# Patient Record
Sex: FEMALE | Race: WHITE | Employment: OTHER | URBAN - METROPOLITAN AREA
[De-identification: names, ages, dates, MRNs, and addresses within clinical notes are randomized per-mention and may not be internally consistent; named-entity substitution may affect disease eponyms.]

---

## 2017-04-07 ENCOUNTER — HOSPITAL ENCOUNTER (EMERGENCY)
Age: 27
Discharge: HOME | End: 2017-04-07
Attending: EMERGENCY MEDICINE

## 2017-04-07 ENCOUNTER — APPOINTMENT (OUTPATIENT)
Dept: GENERAL RADIOLOGY | Age: 27
End: 2017-04-07
Attending: EMERGENCY MEDICINE

## 2017-04-07 VITALS
RESPIRATION RATE: 18 BRPM | HEIGHT: 66 IN | SYSTOLIC BLOOD PRESSURE: 112 MMHG | HEART RATE: 78 BPM | WEIGHT: 182 LBS | TEMPERATURE: 98 F | OXYGEN SATURATION: 100 % | DIASTOLIC BLOOD PRESSURE: 66 MMHG | BODY MASS INDEX: 29.25 KG/M2

## 2017-04-07 DIAGNOSIS — R42 DIZZINESS: Primary | ICD-10-CM

## 2017-04-07 PROCEDURE — 99285 EMERGENCY DEPT VISIT HI MDM: CPT

## 2017-04-07 PROCEDURE — 85025 COMPLETE CBC W/AUTO DIFF WBC: CPT | Performed by: EMERGENCY MEDICINE

## 2017-04-07 PROCEDURE — 93005 ELECTROCARDIOGRAM TRACING: CPT

## 2017-04-07 PROCEDURE — 84484 ASSAY OF TROPONIN QUANT: CPT | Performed by: EMERGENCY MEDICINE

## 2017-04-07 PROCEDURE — 80053 COMPREHEN METABOLIC PANEL: CPT | Performed by: EMERGENCY MEDICINE

## 2017-04-07 PROCEDURE — 81003 URINALYSIS AUTO W/O SCOPE: CPT | Performed by: EMERGENCY MEDICINE

## 2017-04-07 PROCEDURE — 36415 COLL VENOUS BLD VENIPUNCTURE: CPT

## 2017-04-07 PROCEDURE — 71010 XR CHEST AP/PA (1 VIEW) (CPT=71010): CPT

## 2017-04-07 PROCEDURE — 93010 ELECTROCARDIOGRAM REPORT: CPT

## 2017-04-07 NOTE — ED PROVIDER NOTES
Patient Seen in: THE [facility] In [place]    History   Patient presents with:  Dizziness (neurologic)    Stated Complaint: dizziness    HPI    44-year-old female that comes in the hospital the chief complaint of having difficulty with ha Maternal Grandfather    • Hypertension Maternal Grandfather          Smoking Status: Never Smoker                      Smokeless Status: Never Used                        Alcohol Use: No                Review of Systems    Positive for stated complaint: di DIFFERENTIAL[[id number]]                              Final result                 Please view results for these tests on the individual orders. CBC W/ DIFFERENTIAL      EKG    Rate, intervals and axes as noted on EKG Report.   Rate: 83  Rhythm: Sinus Rhyt

## 2017-04-07 NOTE — ED INITIAL ASSESSMENT (HPI)
Pt here c/o dizziness for last 4-5 days. States worse lying down. Denies chest pain or nausea. Feels like passing out.

## 2017-05-11 ENCOUNTER — APPOINTMENT (OUTPATIENT)
Dept: LAB | Age: 27
End: 2017-05-11
Attending: FAMILY MEDICINE
Payer: COMMERCIAL

## 2017-05-11 ENCOUNTER — HOSPITAL ENCOUNTER (OUTPATIENT)
Dept: ULTRASOUND IMAGING | Age: 27
Discharge: HOME | End: 2017-05-11
Attending: FAMILY MEDICINE
Payer: COMMERCIAL

## 2017-05-11 ENCOUNTER — OFFICE VISIT (OUTPATIENT)
Dept: FAMILY MEDICINE CLINIC | Facility: CLINIC | Age: 27
End: 2017-05-11

## 2017-05-11 VITALS
BODY MASS INDEX: 31.02 KG/M2 | SYSTOLIC BLOOD PRESSURE: 110 MMHG | DIASTOLIC BLOOD PRESSURE: 70 MMHG | HEIGHT: 66 IN | HEART RATE: 84 BPM | RESPIRATION RATE: 16 BRPM | WEIGHT: 193 LBS

## 2017-05-11 DIAGNOSIS — R79.89 T3 LOW IN SERUM: ICD-10-CM

## 2017-05-11 DIAGNOSIS — E55.9 VITAMIN D DEFICIENCY: ICD-10-CM

## 2017-05-11 DIAGNOSIS — E04.2 MULTINODULAR GOITER: ICD-10-CM

## 2017-05-11 DIAGNOSIS — R94.6 ABNORMAL THYROID FUNCTION TEST: ICD-10-CM

## 2017-05-11 DIAGNOSIS — N92.0 MENORRHAGIA WITH REGULAR CYCLE: ICD-10-CM

## 2017-05-11 DIAGNOSIS — L30.9 DERMATITIS: ICD-10-CM

## 2017-05-11 DIAGNOSIS — E53.8 VITAMIN B 12 DEFICIENCY: ICD-10-CM

## 2017-05-11 DIAGNOSIS — R53.83 FATIGUE, UNSPECIFIED TYPE: ICD-10-CM

## 2017-05-11 DIAGNOSIS — E01.0 THYROMEGALY: ICD-10-CM

## 2017-05-11 DIAGNOSIS — R63.5 WEIGHT GAIN: Primary | ICD-10-CM

## 2017-05-11 PROCEDURE — 86140 C-REACTIVE PROTEIN: CPT

## 2017-05-11 PROCEDURE — 86800 THYROGLOBULIN ANTIBODY: CPT

## 2017-05-11 PROCEDURE — 84445 ASSAY OF TSI GLOBULIN: CPT

## 2017-05-11 PROCEDURE — 84481 FREE ASSAY (FT-3): CPT

## 2017-05-11 PROCEDURE — 76536 US EXAM OF HEAD AND NECK: CPT | Performed by: FAMILY MEDICINE

## 2017-05-11 PROCEDURE — 86003 ALLG SPEC IGE CRUDE XTRC EA: CPT

## 2017-05-11 PROCEDURE — 84480 ASSAY TRIIODOTHYRONINE (T3): CPT

## 2017-05-11 PROCEDURE — 82607 VITAMIN B-12: CPT

## 2017-05-11 PROCEDURE — 84443 ASSAY THYROID STIM HORMONE: CPT

## 2017-05-11 PROCEDURE — 36415 COLL VENOUS BLD VENIPUNCTURE: CPT

## 2017-05-11 PROCEDURE — 86376 MICROSOMAL ANTIBODY EACH: CPT

## 2017-05-11 PROCEDURE — 99214 OFFICE O/P EST MOD 30 MIN: CPT | Performed by: FAMILY MEDICINE

## 2017-05-11 PROCEDURE — 82306 VITAMIN D 25 HYDROXY: CPT

## 2017-05-11 PROCEDURE — 84439 ASSAY OF FREE THYROXINE: CPT

## 2017-05-12 ENCOUNTER — MED REC SCAN ONLY (OUTPATIENT)
Dept: FAMILY MEDICINE CLINIC | Facility: CLINIC | Age: 27
End: 2017-05-12

## 2017-05-12 ENCOUNTER — TELEPHONE (OUTPATIENT)
Dept: FAMILY MEDICINE CLINIC | Facility: CLINIC | Age: 27
End: 2017-05-12

## 2017-05-12 PROBLEM — R53.83 FATIGUE: Status: ACTIVE | Noted: 2017-05-12

## 2017-05-12 NOTE — TELEPHONE ENCOUNTER
lmom for pt that thyroid is slightly enlarged and Dr. Sandra Shukla will discuss more with her at her upcoming appt on 5/25/17. Asked pt after reviewing message to call office with any questions.

## 2017-05-12 NOTE — TELEPHONE ENCOUNTER
----- Message from Tony Ramirez DO sent at 5/11/2017  5:53 PM CDT -----  Please call pt: thyroid is slightly enlarged and is similarly enlarged compared to the u/s done 10/23/2012.   The prior heterogeneity reported on the last u/s is currently no appre

## 2017-05-25 ENCOUNTER — OFFICE VISIT (OUTPATIENT)
Dept: FAMILY MEDICINE CLINIC | Facility: CLINIC | Age: 27
End: 2017-05-25

## 2017-05-25 VITALS
RESPIRATION RATE: 16 BRPM | HEIGHT: 66 IN | BODY MASS INDEX: 31.53 KG/M2 | WEIGHT: 196.19 LBS | DIASTOLIC BLOOD PRESSURE: 64 MMHG | SYSTOLIC BLOOD PRESSURE: 108 MMHG | HEART RATE: 104 BPM

## 2017-05-25 DIAGNOSIS — R63.5 WEIGHT GAIN: ICD-10-CM

## 2017-05-25 DIAGNOSIS — M79.642 BILATERAL HAND PAIN: Primary | ICD-10-CM

## 2017-05-25 DIAGNOSIS — E66.09 NON MORBID OBESITY DUE TO EXCESS CALORIES: ICD-10-CM

## 2017-05-25 DIAGNOSIS — H69.83 EUSTACHIAN TUBE DYSFUNCTION, BILATERAL: ICD-10-CM

## 2017-05-25 DIAGNOSIS — M79.641 BILATERAL HAND PAIN: Primary | ICD-10-CM

## 2017-05-25 DIAGNOSIS — N93.0 POSTCOITAL BLEEDING: ICD-10-CM

## 2017-05-25 DIAGNOSIS — E55.9 VITAMIN D DEFICIENCY: ICD-10-CM

## 2017-05-25 PROBLEM — H69.80 EUSTACHIAN TUBE DYSFUNCTION: Status: ACTIVE | Noted: 2017-05-25

## 2017-05-25 PROCEDURE — 99214 OFFICE O/P EST MOD 30 MIN: CPT | Performed by: FAMILY MEDICINE

## 2017-05-25 NOTE — PROGRESS NOTES
Jazmyn Cesar is a 27year old female. HPI:       Hand pain:  Hands, points to the anterior central palm area. Pain up to 5-6 out of 10. Only gets the pain with gripping. Denies having hand pain at rest.  Denies weakness of the hands.   Denies numb Date   • Multinodular goiter 1/15/2013   • Vitamin D deficiency 1/15/2013   • Vitamin B 12 deficiency 1/15/2013   • Family history of nephrotic syndrome 4/25/2013     younger brother    • Enlarged thyroid    • Migraine 6/25/2013   • Family history of kidne and moist.   Eyes: Conjunctivae and EOM are normal. Pupils are equal, round, and reactive to light. Neck: Neck supple. No thyromegaly present. Cardiovascular: Normal rate, regular rhythm and normal heart sounds. No murmur heard. Edema not present. calories  As above in #3.    5. Weight gain  Above in #3. 6. Eustachian tube dysfunction, bilateral  Okay to try over-the-counter Flonase.     7.  Vitamin D deficiency  Recommend take over-the-counter vitamin D 2000 units once a day with the biggest meal

## 2017-05-30 ENCOUNTER — OFFICE VISIT (OUTPATIENT)
Dept: FAMILY MEDICINE CLINIC | Facility: CLINIC | Age: 27
End: 2017-05-30

## 2017-05-30 ENCOUNTER — OFFICE VISIT (OUTPATIENT)
Dept: OCCUPATIONAL MEDICINE | Age: 27
End: 2017-05-30
Attending: FAMILY MEDICINE
Payer: COMMERCIAL

## 2017-05-30 VITALS
WEIGHT: 197 LBS | HEART RATE: 80 BPM | SYSTOLIC BLOOD PRESSURE: 108 MMHG | BODY MASS INDEX: 31.66 KG/M2 | RESPIRATION RATE: 16 BRPM | HEIGHT: 66 IN | DIASTOLIC BLOOD PRESSURE: 64 MMHG

## 2017-05-30 DIAGNOSIS — M79.641 BILATERAL HAND PAIN: Primary | ICD-10-CM

## 2017-05-30 DIAGNOSIS — R21 RASH: Primary | ICD-10-CM

## 2017-05-30 DIAGNOSIS — M79.642 BILATERAL HAND PAIN: Primary | ICD-10-CM

## 2017-05-30 PROCEDURE — 99213 OFFICE O/P EST LOW 20 MIN: CPT | Performed by: FAMILY MEDICINE

## 2017-05-30 PROCEDURE — 97110 THERAPEUTIC EXERCISES: CPT

## 2017-05-30 PROCEDURE — 97166 OT EVAL MOD COMPLEX 45 MIN: CPT

## 2017-05-30 NOTE — PROGRESS NOTES
Barb Callejas is a 27year old female here for Patient presents with:   Infection: possible staph infection on legs, back, & abdomen x 1 week       HPI:     Rash  -started 1 wk ago  -on legs, then abd, then back   -start out as pimples, then became mor Sumatriptan             Other (See Comments)    Comment:Burning  Topamax [Topiramate]    Nausea and vomiting, Other (See                            Comments)    Comment:Hair loss      ROS:     --GEN: Denies  --HEENT: Denies  --RESP: Denies  --CV: Denies

## 2017-05-30 NOTE — PROGRESS NOTES
OCCUPATIONAL THERAPY UPPER EXTREMITY EVALUATION   Referring Physician: Dr. Yareli Ambriz  Diagnosis: bilateral hand pain     Date of Service: 5/30/2017     PATIENT Reggie Cunningham is a 27year old y/o female who presents to therapy today with compla Right Average Left Average   : 65, 52, 45 50, 45, 42   3 pt Pinch: 13 12   Lateral Pinch: 15 13     NECK SCREEN: Cervical ROM is intact    SPECIAL TESTS: 9 hole peg R=19 sec, L=21 sec.    (-) Tinels at carpal tunnel  (-) Phalens  No thickening noted on To:8/28/2017

## 2017-05-30 NOTE — PATIENT INSTRUCTIONS
-- start mupirocin ointment 2x/day for 7-10 days  --also use warm compress 2-3x/day for 5-10 minutes each time  -wash with warm water and soap with each shower or bath  -- if keeps coming back, let us know   -- followup if not improving or worsening

## 2017-06-01 ENCOUNTER — APPOINTMENT (OUTPATIENT)
Dept: LAB | Age: 27
End: 2017-06-01
Attending: FAMILY MEDICINE
Payer: COMMERCIAL

## 2017-06-01 DIAGNOSIS — L30.9 DERMATITIS: ICD-10-CM

## 2017-06-01 DIAGNOSIS — R63.5 WEIGHT GAIN: ICD-10-CM

## 2017-06-01 DIAGNOSIS — R53.83 FATIGUE, UNSPECIFIED TYPE: ICD-10-CM

## 2017-06-01 PROCEDURE — 36415 COLL VENOUS BLD VENIPUNCTURE: CPT

## 2017-06-01 PROCEDURE — 85652 RBC SED RATE AUTOMATED: CPT

## 2017-06-05 ENCOUNTER — APPOINTMENT (OUTPATIENT)
Dept: OCCUPATIONAL MEDICINE | Age: 27
End: 2017-06-05
Attending: FAMILY MEDICINE
Payer: COMMERCIAL

## 2017-06-06 ENCOUNTER — OFFICE VISIT (OUTPATIENT)
Dept: OBGYN CLINIC | Facility: CLINIC | Age: 27
End: 2017-06-06

## 2017-06-06 ENCOUNTER — APPOINTMENT (OUTPATIENT)
Dept: LAB | Facility: HOSPITAL | Age: 27
End: 2017-06-06
Attending: FAMILY MEDICINE
Payer: COMMERCIAL

## 2017-06-06 VITALS
HEIGHT: 66 IN | HEART RATE: 79 BPM | DIASTOLIC BLOOD PRESSURE: 68 MMHG | SYSTOLIC BLOOD PRESSURE: 122 MMHG | WEIGHT: 194 LBS | BODY MASS INDEX: 31.18 KG/M2

## 2017-06-06 DIAGNOSIS — R63.5 WEIGHT GAIN: ICD-10-CM

## 2017-06-06 DIAGNOSIS — R53.83 FATIGUE: ICD-10-CM

## 2017-06-06 DIAGNOSIS — N93.0 PCB (POST COITAL BLEEDING): Primary | ICD-10-CM

## 2017-06-06 PROCEDURE — 88175 CYTOPATH C/V AUTO FLUID REDO: CPT | Performed by: NURSE PRACTITIONER

## 2017-06-06 PROCEDURE — 87510 GARDNER VAG DNA DIR PROBE: CPT | Performed by: NURSE PRACTITIONER

## 2017-06-06 PROCEDURE — 99203 OFFICE O/P NEW LOW 30 MIN: CPT | Performed by: NURSE PRACTITIONER

## 2017-06-06 PROCEDURE — 87480 CANDIDA DNA DIR PROBE: CPT | Performed by: NURSE PRACTITIONER

## 2017-06-06 PROCEDURE — 87660 TRICHOMONAS VAGIN DIR PROBE: CPT | Performed by: NURSE PRACTITIONER

## 2017-06-06 PROCEDURE — 82530 CORTISOL FREE: CPT

## 2017-06-06 NOTE — PROGRESS NOTES
Gyne note       S: patient is a 27year old yo  here for post coital bleeding. Patient has had menses like bleeding after intercourse for the last 4 months. Bleeding has been heavy at times but tapers quickly. Denies pain with intercourse.  Patient h

## 2017-06-08 ENCOUNTER — PATIENT MESSAGE (OUTPATIENT)
Dept: OBGYN CLINIC | Facility: CLINIC | Age: 27
End: 2017-06-08

## 2017-06-08 DIAGNOSIS — N93.0 PCB (POST COITAL BLEEDING): Primary | ICD-10-CM

## 2017-06-08 NOTE — TELEPHONE ENCOUNTER
From: Lorenzo Gregory RN  To: Zachery Au  Sent: 6/8/2017 2:03 PM CDT  Subject: Results    Hi Ely,    All your testing was normal. If you have any questions or concerns please let us know.     Thanks,  Patrick Gaxiola RN

## 2017-06-13 ENCOUNTER — OFFICE VISIT (OUTPATIENT)
Dept: OCCUPATIONAL MEDICINE | Age: 27
End: 2017-06-13
Attending: FAMILY MEDICINE
Payer: COMMERCIAL

## 2017-06-13 ENCOUNTER — PATIENT MESSAGE (OUTPATIENT)
Dept: FAMILY MEDICINE CLINIC | Facility: CLINIC | Age: 27
End: 2017-06-13

## 2017-06-13 DIAGNOSIS — M25.512 ACUTE PAIN OF LEFT SHOULDER: Primary | ICD-10-CM

## 2017-06-13 PROCEDURE — 97140 MANUAL THERAPY 1/> REGIONS: CPT

## 2017-06-13 PROCEDURE — 97110 THERAPEUTIC EXERCISES: CPT

## 2017-06-13 PROCEDURE — 97035 APP MDLTY 1+ULTRASOUND EA 15: CPT

## 2017-06-13 NOTE — PROGRESS NOTES
Dx: Bilateral hand pain          Authorized # of Visits:  8         Next MD visit: none scheduled  Fall Risk: standard         Precautions: n/a             Subjective: \"They're bad today. I couldn't even do the dishes.   My shoulder has been really flared

## 2017-06-15 ENCOUNTER — OFFICE VISIT (OUTPATIENT)
Dept: OCCUPATIONAL MEDICINE | Age: 27
End: 2017-06-15
Attending: FAMILY MEDICINE
Payer: COMMERCIAL

## 2017-06-15 PROCEDURE — 97035 APP MDLTY 1+ULTRASOUND EA 15: CPT

## 2017-06-15 PROCEDURE — 97110 THERAPEUTIC EXERCISES: CPT

## 2017-06-15 PROCEDURE — 97140 MANUAL THERAPY 1/> REGIONS: CPT

## 2017-06-15 NOTE — PROGRESS NOTES
Dx: Bilateral hand pain          Authorized # of Visits:  8         Next MD visit: none scheduled  Fall Risk: standard         Precautions: n/a             Subjective: \"They felt less tense after last time, so better.   I really noticed it when I was braid min) 1 MT ( 15 min), 1 US   Total Timed Treatment: 45 min     Total Treatment Time: 45 min

## 2017-06-20 ENCOUNTER — OFFICE VISIT (OUTPATIENT)
Dept: PHYSICAL THERAPY | Age: 27
End: 2017-06-20
Attending: FAMILY MEDICINE
Payer: COMMERCIAL

## 2017-06-20 ENCOUNTER — OFFICE VISIT (OUTPATIENT)
Dept: OCCUPATIONAL MEDICINE | Age: 27
End: 2017-06-20
Attending: FAMILY MEDICINE
Payer: COMMERCIAL

## 2017-06-20 DIAGNOSIS — M25.512 ACUTE PAIN OF LEFT SHOULDER: Primary | ICD-10-CM

## 2017-06-20 PROCEDURE — 97035 APP MDLTY 1+ULTRASOUND EA 15: CPT

## 2017-06-20 PROCEDURE — 97161 PT EVAL LOW COMPLEX 20 MIN: CPT

## 2017-06-20 PROCEDURE — 97110 THERAPEUTIC EXERCISES: CPT

## 2017-06-20 PROCEDURE — 97140 MANUAL THERAPY 1/> REGIONS: CPT

## 2017-06-20 NOTE — PROGRESS NOTES
Dx: Bilateral hand pain          Authorized # of Visits:  8         Next MD visit: none scheduled  Fall Risk: standard         Precautions: n/a             Subjective:  \"It feels like I jammed the wrist (left wrist), I don't know if I did something to it o wrist flexors and carpal tunnel, left: carpal tunnel band only. In-hand manipulation with eggercizer    Thumb isolation tasks       Sleep positioning, ergonomics.   Kinesiotape to wrist flexors and carpal tunnel,                                           S

## 2017-06-20 NOTE — PROGRESS NOTES
SPINE EVALUATION:   Referring Physician: Dr. Maren Pleitez  Diagnosis: Acute pain of left shoulder (M25.512) Date of Service: 6/20/2017     PATIENT Matthew Hilario is a 27year old y/o female who presents to therapy today with complaints of const scapular pain 4/10  Repeated Retraction and Rotation- L   WNL   Increased left medial scapular pain 5/10  Repeated Retraction and LF - L    WNL   Produced left medial scapular burning pain 4-5/10    Shoulder:  AROM Strength   Flexion: R WNL; L WNL  Abducti scapular pain to improve tolerance for using left UE for ADLs (8 visits)  Patient will be able to perform house work such as laundry and washing dishes without difficulty or increased pain (8 visits)  Patient will be able to lift/lower an an object >=5# fr

## 2017-06-21 ENCOUNTER — HOSPITAL ENCOUNTER (OUTPATIENT)
Dept: ULTRASOUND IMAGING | Facility: HOSPITAL | Age: 27
Discharge: HOME | End: 2017-06-21
Attending: NURSE PRACTITIONER
Payer: COMMERCIAL

## 2017-06-21 DIAGNOSIS — N93.0 PCB (POST COITAL BLEEDING): ICD-10-CM

## 2017-06-21 PROCEDURE — 76856 US EXAM PELVIC COMPLETE: CPT | Performed by: NURSE PRACTITIONER

## 2017-06-21 PROCEDURE — 76830 TRANSVAGINAL US NON-OB: CPT | Performed by: NURSE PRACTITIONER

## 2017-06-22 ENCOUNTER — OFFICE VISIT (OUTPATIENT)
Dept: PHYSICAL THERAPY | Age: 27
End: 2017-06-22
Attending: FAMILY MEDICINE
Payer: COMMERCIAL

## 2017-06-22 ENCOUNTER — OFFICE VISIT (OUTPATIENT)
Dept: OCCUPATIONAL MEDICINE | Age: 27
End: 2017-06-22
Attending: FAMILY MEDICINE
Payer: COMMERCIAL

## 2017-06-22 PROCEDURE — 97110 THERAPEUTIC EXERCISES: CPT

## 2017-06-22 PROCEDURE — 97035 APP MDLTY 1+ULTRASOUND EA 15: CPT

## 2017-06-22 PROCEDURE — 97140 MANUAL THERAPY 1/> REGIONS: CPT

## 2017-06-22 NOTE — PROGRESS NOTES
Dx: Acute pain of left shoulder (M25.512)         Authorized # of Visits:  2/8        Next MD visit: none scheduled  Fall Risk: standard         Precautions: n/a             Subjective: Since last visit the patient states that she feels \"about the same. \" scapula pain, NW, PDM returning  Supine retraction extension        WNL   NE    Manual Therapy:  Supine manual cervical traction decreased left medial scapular pain 2/10, less pressure  Supine manual cervical traction with repeated retraction increased lef

## 2017-06-22 NOTE — PROGRESS NOTES
Dx: Bilateral hand pain          Authorized # of Visits:  8         Next MD visit: none scheduled  Fall Risk: standard         Precautions: n/a             Subjective: \"It's not painful right now, it's tight more than anything. \"    Objective: Pain throug and carpal tunnel, left: carpal tunnel band only. In-hand manipulation with eggercizer    Thumb isolation tasks Beige flex bar wrist flexion/extension x 20      Sleep positioning, ergonomics.   Kinesiotape to wrist flexors and carpal tunnel, Digit ADD/ABD

## 2017-06-27 ENCOUNTER — OFFICE VISIT (OUTPATIENT)
Dept: PHYSICAL THERAPY | Age: 27
End: 2017-06-27
Attending: FAMILY MEDICINE
Payer: COMMERCIAL

## 2017-06-27 ENCOUNTER — APPOINTMENT (OUTPATIENT)
Dept: OCCUPATIONAL MEDICINE | Age: 27
End: 2017-06-27
Attending: FAMILY MEDICINE
Payer: COMMERCIAL

## 2017-06-27 PROCEDURE — 97110 THERAPEUTIC EXERCISES: CPT

## 2017-06-27 PROCEDURE — 97140 MANUAL THERAPY 1/> REGIONS: CPT

## 2017-06-27 PROCEDURE — 97035 APP MDLTY 1+ULTRASOUND EA 15: CPT

## 2017-06-27 NOTE — PROGRESS NOTES
Dx: Acute pain of left shoulder (M25.512)         Authorized # of Visits:  3/8        Next MD visit: none scheduled  Fall Risk: standard         Precautions: n/a             Subjective: Since last visit the patient states in occupational therapy her  s NE  Supine with manual traction and repeated retraction: increased \"burning\"  Supine with manual traction and repeated lateral flexion: increased \"burning\"    Pretest symptoms in lying: prone lying left scapular pain 4/10 with less \"burning\"  Prone l

## 2017-06-27 NOTE — PROGRESS NOTES
Dx: Bilateral hand pain          Authorized # of Visits:  8         Next MD visit: none scheduled  Fall Risk: standard         Precautions: n/a             Subjective:   \"Pain has been less severe.  They got a little flared up because I had a big load of d foam blocks bilaterally In-hand manipulation with chips Large tweezer  task     Kinesiotape to carpal tunnels Kinesiotape to right to wrist flexors and carpal tunnel, left: carpal tunnel band only.   In-hand manipulation with eggercizer    Thumb isolation t

## 2017-06-28 ENCOUNTER — APPOINTMENT (OUTPATIENT)
Dept: GENERAL RADIOLOGY | Age: 27
End: 2017-06-28
Attending: PHYSICIAN ASSISTANT
Payer: COMMERCIAL

## 2017-06-28 ENCOUNTER — HOSPITAL ENCOUNTER (EMERGENCY)
Age: 27
Discharge: HOME | End: 2017-06-28
Attending: EMERGENCY MEDICINE
Payer: COMMERCIAL

## 2017-06-28 VITALS
TEMPERATURE: 99 F | SYSTOLIC BLOOD PRESSURE: 122 MMHG | RESPIRATION RATE: 18 BRPM | BODY MASS INDEX: 29.73 KG/M2 | WEIGHT: 185 LBS | HEIGHT: 66 IN | HEART RATE: 81 BPM | DIASTOLIC BLOOD PRESSURE: 85 MMHG

## 2017-06-28 DIAGNOSIS — M89.8X1 PAIN OF LEFT SCAPULA: ICD-10-CM

## 2017-06-28 DIAGNOSIS — G89.29 OTHER CHRONIC PAIN: Primary | ICD-10-CM

## 2017-06-28 PROCEDURE — 73010 X-RAY EXAM OF SHOULDER BLADE: CPT | Performed by: PHYSICIAN ASSISTANT

## 2017-06-28 PROCEDURE — 99283 EMERGENCY DEPT VISIT LOW MDM: CPT

## 2017-06-28 RX ORDER — DIAZEPAM 2 MG/1
2 TABLET ORAL EVERY 6 HOURS PRN
Qty: 10 TABLET | Refills: 0 | Status: SHIPPED | OUTPATIENT
Start: 2017-06-28 | End: 2017-07-05

## 2017-06-28 RX ORDER — DIAZEPAM 10 MG/1
10 TABLET ORAL ONCE
Status: COMPLETED | OUTPATIENT
Start: 2017-06-28 | End: 2017-06-28

## 2017-06-28 RX ORDER — ACETAMINOPHEN 500 MG
1000 TABLET ORAL ONCE
Status: COMPLETED | OUTPATIENT
Start: 2017-06-28 | End: 2017-06-28

## 2017-06-28 NOTE — ED PROVIDER NOTES
Patient Seen in: THE [facility] In [place]    History   Patient presents with:  Upper Extremity Injury (musculoskeletal)    Stated Complaint: shoulder pain    HPI    21-year-old female with a history of idiopathic left scapular pain for t Smoking status: Never Smoker                                                              Smokeless tobacco: Never Used                      Alcohol use:  No               Comment: RARE      Review of Systems    Positive for stated complaint: shoulder pain PROCEDURE:  XR SCAPULA, COMPLETE, LEFT (CPT=73010)     INDICATIONS:  shoulder pain     COMPARISON:  None.     PATIENT STATED HISTORY: (As transcribed by Technologist)  Patient with chronic pain to her left shoulder for 10 years.  Her pain is intermittent a

## 2017-06-28 NOTE — ED PROVIDER NOTES
Patient reports ongoing pain now for 10 years. She points to an area at the superior medial scapula. She was doing quite a bit of work today rearranging things at the  where she is employed. She believes this may have exacerbated her problem.   Gudelia Mendieta

## 2017-06-29 ENCOUNTER — APPOINTMENT (OUTPATIENT)
Dept: OCCUPATIONAL MEDICINE | Age: 27
End: 2017-06-29
Attending: FAMILY MEDICINE
Payer: COMMERCIAL

## 2017-07-03 ENCOUNTER — OFFICE VISIT (OUTPATIENT)
Dept: OCCUPATIONAL MEDICINE | Age: 27
End: 2017-07-03
Attending: FAMILY MEDICINE
Payer: COMMERCIAL

## 2017-07-03 PROCEDURE — 97140 MANUAL THERAPY 1/> REGIONS: CPT

## 2017-07-03 PROCEDURE — 97110 THERAPEUTIC EXERCISES: CPT

## 2017-07-03 PROCEDURE — 97035 APP MDLTY 1+ULTRASOUND EA 15: CPT

## 2017-07-03 NOTE — PROGRESS NOTES
Dx: Bilateral hand pain          Authorized # of Visits:  8         Next MD visit: none scheduled  Fall Risk: standard         Precautions: n/a             Subjective:   \"I definitely think it's getting better. I'm having a lot less and pressure. \"  0/10 wrist, thumb ROM to wrist, thumb    Tendon glides Median nerve gides    Brachial plexus glides Roll/pinch yellow foam blocks bilaterally In-hand manipulation with chips Large tweezer  task In-hand manipulation and pinch with beans    Kinesiotape to carpal

## 2017-07-05 ENCOUNTER — OFFICE VISIT (OUTPATIENT)
Dept: FAMILY MEDICINE CLINIC | Facility: CLINIC | Age: 27
End: 2017-07-05

## 2017-07-05 VITALS
WEIGHT: 196 LBS | SYSTOLIC BLOOD PRESSURE: 118 MMHG | BODY MASS INDEX: 31.5 KG/M2 | DIASTOLIC BLOOD PRESSURE: 64 MMHG | HEART RATE: 79 BPM | TEMPERATURE: 98 F | RESPIRATION RATE: 16 BRPM | HEIGHT: 66 IN

## 2017-07-05 DIAGNOSIS — G89.29 CHRONIC LEFT SHOULDER PAIN: ICD-10-CM

## 2017-07-05 DIAGNOSIS — N30.01 ACUTE CYSTITIS WITH HEMATURIA: Primary | ICD-10-CM

## 2017-07-05 DIAGNOSIS — N76.4 ABSCESS OF LABIA: ICD-10-CM

## 2017-07-05 DIAGNOSIS — M25.512 CHRONIC LEFT SHOULDER PAIN: ICD-10-CM

## 2017-07-05 DIAGNOSIS — R30.0 DYSURIA: ICD-10-CM

## 2017-07-05 LAB
BILIRUBIN: NEGATIVE
GLUCOSE (URINE DIPSTICK): NEGATIVE MG/DL
KETONES (URINE DIPSTICK): NEGATIVE MG/DL
MULTISTIX LOT#: NORMAL NUMERIC
NITRITE, URINE: POSITIVE
PH, URINE: 5 (ref 4.5–8)
PROTEIN (URINE DIPSTICK): NEGATIVE MG/DL
SPECIFIC GRAVITY: 1 (ref 1–1.03)
UROBILINOGEN,SEMI-QN: 0.2 MG/DL (ref 0–1.9)

## 2017-07-05 PROCEDURE — 81003 URINALYSIS AUTO W/O SCOPE: CPT | Performed by: FAMILY MEDICINE

## 2017-07-05 PROCEDURE — 99214 OFFICE O/P EST MOD 30 MIN: CPT | Performed by: FAMILY MEDICINE

## 2017-07-05 PROCEDURE — 87086 URINE CULTURE/COLONY COUNT: CPT | Performed by: FAMILY MEDICINE

## 2017-07-05 RX ORDER — NITROFURANTOIN 25; 75 MG/1; MG/1
100 CAPSULE ORAL 2 TIMES DAILY
Qty: 6 CAPSULE | Refills: 0 | Status: SHIPPED | OUTPATIENT
Start: 2017-07-05 | End: 2017-07-05 | Stop reason: ALTCHOICE

## 2017-07-05 RX ORDER — SULFAMETHOXAZOLE AND TRIMETHOPRIM 800; 160 MG/1; MG/1
1 TABLET ORAL EVERY 12 HOURS
Qty: 20 TABLET | Refills: 0 | Status: SHIPPED | OUTPATIENT
Start: 2017-07-05 | End: 2017-07-15

## 2017-07-05 RX ORDER — DIAZEPAM 2 MG/1
2 TABLET ORAL NIGHTLY PRN
Qty: 10 TABLET | Refills: 0 | Status: SHIPPED | OUTPATIENT
Start: 2017-07-05 | End: 2017-09-12 | Stop reason: ALTCHOICE

## 2017-07-05 NOTE — PATIENT INSTRUCTIONS
Abscess (Antibiotic Treatment Only)  An abscess (sometimes called a “boil”) happens when bacteria get trapped under the skin and start to grow. Pus forms inside the abscess as the body responds to the bacteria.  An abscess can happen with an insect bite, © 4126-8112 [address], [address] [place]. All rights reserved. This information is not intended as a substitute for professional medical care. Always follow your healthcare professional's instructions.         Jonny Banks

## 2017-07-05 NOTE — PROGRESS NOTES
Sindi Francis is a 27year old female. HPI:   Patient presents with symptoms of UTI for 2-3 days. Symptoms include urinary frequency, urgency, dysuria, and suprapubic pressure. No fever, chills, bodyaches, nausea, vomiting or diarrhea.   Sexually yesterday. SKIN: As in HPI  RESPIRATORY: no shortness of breath with exertion  CARDIOVASCULAR: denies chest pain on exertion  GI: no nausea, vomiting or diarrhea. Denies abdominal pain or CVA tenderness.   : see HPI  NEURO: denies headaches    EXAM:   B Oral Tab per tablet 20 tablet 0      Sig: Take 1 tablet by mouth Q12H.      diazepam 2 MG Oral Tab 10 tablet 0      Sig: Take 1 tablet (2 mg total) by mouth nightly as needed (Shoulder pain).            Imaging & Consults:  ORTHOPEDIC - INTERNAL    Return i

## 2017-07-06 ENCOUNTER — APPOINTMENT (OUTPATIENT)
Dept: OCCUPATIONAL MEDICINE | Age: 27
End: 2017-07-06
Attending: FAMILY MEDICINE
Payer: COMMERCIAL

## 2017-07-08 ENCOUNTER — OFFICE VISIT (OUTPATIENT)
Dept: FAMILY MEDICINE CLINIC | Facility: CLINIC | Age: 27
End: 2017-07-08

## 2017-07-08 VITALS
BODY MASS INDEX: 32 KG/M2 | DIASTOLIC BLOOD PRESSURE: 60 MMHG | RESPIRATION RATE: 18 BRPM | HEART RATE: 83 BPM | WEIGHT: 196 LBS | SYSTOLIC BLOOD PRESSURE: 120 MMHG | OXYGEN SATURATION: 99 % | TEMPERATURE: 99 F

## 2017-07-08 DIAGNOSIS — L73.9 FOLLICULITIS: Primary | ICD-10-CM

## 2017-07-08 PROCEDURE — 99212 OFFICE O/P EST SF 10 MIN: CPT | Performed by: PHYSICIAN ASSISTANT

## 2017-07-08 RX ORDER — CLINDAMYCIN PHOSPHATE 10 MG/ML
LOTION TOPICAL
Qty: 60 ML | Refills: 0 | Status: SHIPPED | OUTPATIENT
Start: 2017-07-08 | End: 2017-09-12 | Stop reason: ALTCHOICE

## 2017-07-08 NOTE — PATIENT INSTRUCTIONS
Understanding Folliculitis  Folliculitis is when hair follicles become inflamed. Follicles are the tiny holes from which hair grows out of your skin. This skin condition can occur any place on the body where hair grows.  But it’s often found on the neck, © 5054-1252 [address], [address] [place]. All rights reserved. This information is not intended as a substitute for professional medical care. Always follow your healthcare professional's instructions.

## 2017-07-08 NOTE — PROGRESS NOTES
CHIEF COMPLAINT:   Patient presents with:  Bump: pt c\o of blisters, on vaginal area, x3day      HPI:   Diya Escobar is a 27year old female who presents for evaluation of a rash. Per patient rash started in the past 3 days.  Rash has been worsening Nephrotic syndrome   • Diabetes Paternal Grandmother    • Migraines Paternal Grandmother    • Thyroid Disorder Paternal Grandmother    • Cancer Paternal Grandmother    • Hypertension Paternal Grandfather    • Seizure Disorder Maternal Grandmother    • Th PLAN: Pt to use cream as directed, avoid excessive heat, loose fitting clothing, keep clean with soap/water, no shaving or sexual activity until symptoms resolve. Do not scratch or pick at affected areas. May apply cool compress prn.   Work note provided · Good hygiene. Keeping the skin clean can help. Use a clean razor when shaving. Prevent ingrown hairs after shaving. This can reduce folliculitis in the beard area. Avoid any substances that bother your skin.   When to call your healthcare provider  Call y

## 2017-07-10 ENCOUNTER — OFFICE VISIT (OUTPATIENT)
Dept: OCCUPATIONAL MEDICINE | Age: 27
End: 2017-07-10
Attending: FAMILY MEDICINE
Payer: COMMERCIAL

## 2017-07-10 PROCEDURE — 97035 APP MDLTY 1+ULTRASOUND EA 15: CPT

## 2017-07-10 PROCEDURE — 97140 MANUAL THERAPY 1/> REGIONS: CPT

## 2017-07-10 PROCEDURE — 97110 THERAPEUTIC EXERCISES: CPT

## 2017-07-10 NOTE — PROGRESS NOTES
Dx: Bilateral hand pain          Authorized # of Visits:  8         Next MD visit: none scheduled  Fall Risk: standard         Precautions: n/a              Progress/Discharge Summary    Pt has attended 8, cancelled 3, and no shown 0 visits in Occupational 1.2 w/cm2, 3 mhz, 50%, 7 min jono US 1.2 w/cm2, 3 mhz, 50%, 7 min jono mid palm area and CT US 1.2 w/cm2, 3 mhz, 50%, 7 min jono mid palm area and CT US 1.2 w/cm2, 3 mhz, 50%, 7 min jono mid palm area and CT US 1.2 w/cm2, 3 mhz, 50%, 7 min jono mid palm area an 15 min), 1 US   Total Timed Treatment: 45 min     Total Treatment Time: 45 min

## 2017-07-13 ENCOUNTER — OFFICE VISIT (OUTPATIENT)
Dept: FAMILY MEDICINE CLINIC | Facility: CLINIC | Age: 27
End: 2017-07-13

## 2017-07-13 VITALS
TEMPERATURE: 98 F | DIASTOLIC BLOOD PRESSURE: 62 MMHG | SYSTOLIC BLOOD PRESSURE: 120 MMHG | BODY MASS INDEX: 31.31 KG/M2 | HEIGHT: 66 IN | HEART RATE: 88 BPM | WEIGHT: 194.81 LBS | RESPIRATION RATE: 16 BRPM

## 2017-07-13 DIAGNOSIS — L21.9 SEBORRHEIC DERMATITIS OF SCALP: Primary | ICD-10-CM

## 2017-07-13 PROCEDURE — 99214 OFFICE O/P EST MOD 30 MIN: CPT | Performed by: FAMILY MEDICINE

## 2017-07-13 RX ORDER — KETOCONAZOLE 20 MG/ML
SHAMPOO TOPICAL
Qty: 120 ML | Refills: 1 | Status: SHIPPED | OUTPATIENT
Start: 2017-07-13 | End: 2017-09-12 | Stop reason: ALTCHOICE

## 2017-07-13 NOTE — PROGRESS NOTES
Nupur Harrison is a 27year old female. HPI:       Patient complains of losing hair and one isolated area or spot of her scalp. Patient points to the central crown area. She noticed this 1 or 2 weeks ago. Loss of hair is not spreading.   Patient den hematuria     Abscess of labia     Chronic left shoulder pain     Seborrheic dermatitis of scalp       Contrast Dye [Gadol*      Dilaudid [Hydromorp*      Morphine                  Sumatriptan             Other (See Comments)    Comment:Burning  Topamax [T of this encounter: 66\". Weight as of this encounter: 194 lb 12.8 oz. Physical Exam   Nursing note and vitals reviewed. Constitutional: She is oriented to person, place, and time. She appears well-developed and well-nourished. No distress.    HENT:

## 2017-07-13 NOTE — PATIENT INSTRUCTIONS
Understanding Seborrheic Dermatitis    Seborrheic dermatitis is a common type of rash that causes red, scaly, greasy skin.  It occurs on skin that has oil glands, such as the face, scalp, and upper chest. It tends to last a long time, or go away and com skin gently. You can remove scales with oil and gentle rubbing or a brush. Living with seborrheic dermatitis  Seborrheic dermatitis is an ongoing (chronic) condition. It can go away and then come back.  You will likely need to use shampoo, cream, or ointme

## 2017-07-16 PROBLEM — L21.9 SEBORRHEIC DERMATITIS OF SCALP: Status: ACTIVE | Noted: 2017-07-16

## 2017-07-18 ENCOUNTER — HOSPITAL ENCOUNTER (OUTPATIENT)
Dept: MRI IMAGING | Age: 27
Discharge: HOME | End: 2017-07-18
Attending: ORTHOPAEDIC SURGERY
Payer: COMMERCIAL

## 2017-07-18 DIAGNOSIS — M54.50 LOW BACK PAIN: ICD-10-CM

## 2017-07-18 PROCEDURE — 72141 MRI NECK SPINE W/O DYE: CPT | Performed by: ORTHOPAEDIC SURGERY

## 2017-07-31 ENCOUNTER — TELEPHONE (OUTPATIENT)
Dept: SURGERY | Facility: CLINIC | Age: 27
End: 2017-07-31

## 2017-08-01 ENCOUNTER — OFFICE VISIT (OUTPATIENT)
Dept: NEUROLOGY | Facility: CLINIC | Age: 27
End: 2017-08-01

## 2017-08-01 DIAGNOSIS — M79.18 MYOFASCIAL PAIN ON LEFT SIDE: Primary | ICD-10-CM

## 2017-08-01 DIAGNOSIS — G56.03 CARPAL TUNNEL SYNDROME, BILATERAL: ICD-10-CM

## 2017-08-01 DIAGNOSIS — M50.30 DDD (DEGENERATIVE DISC DISEASE), CERVICAL: ICD-10-CM

## 2017-08-01 PROCEDURE — 99204 OFFICE O/P NEW MOD 45 MIN: CPT | Performed by: NURSE PRACTITIONER

## 2017-08-01 NOTE — H&P
NEUROSURGERY CLINIC VISIT    Ely Au  10/28/1990      lEft scapular pain      HPI:   Rob Vanegas is a 27year old right handed female referred by [employer] Medical History:   Diagnosis Date   • Enlarged thyroid    • Family history of kidney stones 3/9/2013   • Family history of nephrotic syndrome 4/25/2013    younger brother    • Family history of nephrotic syndrome 4/25/2013    younger brother    • Headache Sumatriptan             Other (See Comments)    Comment:Burning  Topamax [Topiramate]    Nausea and vomiting, Other (See                            Comments)    Comment:Hair loss      PHYSICAL EXAMINATION:  LMP 06/29/2017 (Exact Date)   GENERAL:  DDD (degenerative disc disease), cervical    3.  Carpal tunnel syndrome, bilateral          Start physical therapy  Referral to Dr. Deonte Diamond for left trapezius TPI versus suprascapular nerve block  Follow-up 2 months  Advised her to wear wrist splints at night

## 2017-08-03 ENCOUNTER — TELEPHONE (OUTPATIENT)
Dept: NEUROLOGY | Facility: CLINIC | Age: 27
End: 2017-08-03

## 2017-08-03 DIAGNOSIS — M50.30 DDD (DEGENERATIVE DISC DISEASE), CERVICAL: ICD-10-CM

## 2017-08-03 DIAGNOSIS — M79.18 MYOFASCIAL PAIN SYNDROME: Primary | ICD-10-CM

## 2017-08-08 ENCOUNTER — OFFICE VISIT (OUTPATIENT)
Dept: PHYSICAL THERAPY | Age: 27
End: 2017-08-08
Attending: NURSE PRACTITIONER
Payer: COMMERCIAL

## 2017-08-08 DIAGNOSIS — M79.18 MYOFASCIAL PAIN SYNDROME: ICD-10-CM

## 2017-08-08 DIAGNOSIS — M50.30 DDD (DEGENERATIVE DISC DISEASE), CERVICAL: ICD-10-CM

## 2017-08-08 PROCEDURE — 97164 PT RE-EVAL EST PLAN CARE: CPT | Performed by: PHYSICAL THERAPIST

## 2017-08-08 PROCEDURE — 97110 THERAPEUTIC EXERCISES: CPT | Performed by: PHYSICAL THERAPIST

## 2017-08-08 PROCEDURE — 97140 MANUAL THERAPY 1/> REGIONS: CPT | Performed by: PHYSICAL THERAPIST

## 2017-08-08 NOTE — PROGRESS NOTES
Re-evaluation Summary    DX:  Acute pain of left shoulder (M25.512), Myofascial pain syndrome (M79.1), DDD (degenerative disc disease), cervical (M50.30)    Subjective: Patient reports new onset of burning in left shoulder (posterior, lateral, anterior) w shoulder burning    Assessment: Missael Marino is a 27year old female with 10 year history of left medial scapular pain and recent onset of deep left anterior shoulder pain and burning pain in left anterior, lateral, and posterior shoulder.   She presents with cerv

## 2017-08-17 ENCOUNTER — OFFICE VISIT (OUTPATIENT)
Dept: PHYSICAL THERAPY | Age: 27
End: 2017-08-17
Attending: NURSE PRACTITIONER
Payer: COMMERCIAL

## 2017-08-17 PROCEDURE — 97140 MANUAL THERAPY 1/> REGIONS: CPT | Performed by: PHYSICAL THERAPIST

## 2017-08-17 PROCEDURE — 97110 THERAPEUTIC EXERCISES: CPT | Performed by: PHYSICAL THERAPIST

## 2017-08-17 NOTE — PROGRESS NOTES
Dx: Acute pain of left shoulder (M25.512), Myofascial pain syndrome (M79.1), DDD (degenerative disc disease), cervical (M50.30) Authorized # of Visits: 5/8 Next MD visit: none scheduled  Fall Risk: standard Precautions: n/a    Subjective: Patient states th cervical traction with repeated retraction and left lateral flexion decreased left medial scapular pain 1-2/10, increased left shoulder pain 3-4/10    Assessment: Patient had decreased left medial scapular pain with manual cervical traction with repeated m

## 2017-08-29 ENCOUNTER — APPOINTMENT (OUTPATIENT)
Dept: PHYSICAL THERAPY | Age: 27
End: 2017-08-29
Attending: NURSE PRACTITIONER
Payer: COMMERCIAL

## 2017-08-31 ENCOUNTER — OFFICE VISIT (OUTPATIENT)
Dept: PHYSICAL THERAPY | Age: 27
End: 2017-08-31
Attending: NURSE PRACTITIONER
Payer: COMMERCIAL

## 2017-08-31 PROCEDURE — 97140 MANUAL THERAPY 1/> REGIONS: CPT | Performed by: PHYSICAL THERAPIST

## 2017-08-31 PROCEDURE — 97110 THERAPEUTIC EXERCISES: CPT | Performed by: PHYSICAL THERAPIST

## 2017-08-31 NOTE — PROGRESS NOTES
Dx: Acute pain of left shoulder (M25.512), Myofascial pain syndrome (M79.1), DDD (degenerative disc disease), cervical (M50.30) Authorized # of Visits: 6/8 Next MD visit: none scheduled  Fall Risk: standard Precautions: n/a    Subjective: Patient states th Continue Mechanical Diagnosis and Therapy (MDT) of cervical spine, assess response to today's treatment next visit.     Skilled Services: Mechanical Diagnosis and Therapy (MDT) of cervical spine, manual therapy (traction with repeated movements)    Charges:

## 2017-09-05 ENCOUNTER — OFFICE VISIT (OUTPATIENT)
Dept: PHYSICAL THERAPY | Age: 27
End: 2017-09-05
Attending: NURSE PRACTITIONER
Payer: COMMERCIAL

## 2017-09-05 PROCEDURE — 97110 THERAPEUTIC EXERCISES: CPT | Performed by: PHYSICAL THERAPIST

## 2017-09-05 PROCEDURE — 97140 MANUAL THERAPY 1/> REGIONS: CPT | Performed by: PHYSICAL THERAPIST

## 2017-09-05 NOTE — PROGRESS NOTES
Dx: Acute pain of left shoulder (M25.512), Myofascial pain syndrome (M79.1), DDD (degenerative disc disease), cervical (M50.30) Authorized # of Visits: 7/8 Next MD visit: none scheduled  Fall Risk: standard Precautions: n/a    Subjective: Patient reports l extension with self traction with pillowcase centralized neck cool feeling 1/10    Assessment: Patient had left medial scapular pain improved with repeated movements without and with manual cervical traction as noted above.     Plan: Continue Mechanical Stefani

## 2017-09-07 ENCOUNTER — APPOINTMENT (OUTPATIENT)
Dept: PHYSICAL THERAPY | Age: 27
End: 2017-09-07
Attending: NURSE PRACTITIONER
Payer: COMMERCIAL

## 2017-09-12 ENCOUNTER — OFFICE VISIT (OUTPATIENT)
Dept: FAMILY MEDICINE CLINIC | Facility: CLINIC | Age: 27
End: 2017-09-12

## 2017-09-12 VITALS
WEIGHT: 188 LBS | RESPIRATION RATE: 16 BRPM | BODY MASS INDEX: 30 KG/M2 | DIASTOLIC BLOOD PRESSURE: 78 MMHG | OXYGEN SATURATION: 98 % | SYSTOLIC BLOOD PRESSURE: 114 MMHG | TEMPERATURE: 98 F | HEART RATE: 90 BPM

## 2017-09-12 DIAGNOSIS — H92.03 EAR PAIN, BILATERAL: ICD-10-CM

## 2017-09-12 DIAGNOSIS — J01.00 ACUTE NON-RECURRENT MAXILLARY SINUSITIS: Primary | ICD-10-CM

## 2017-09-12 PROCEDURE — 99213 OFFICE O/P EST LOW 20 MIN: CPT | Performed by: PHYSICIAN ASSISTANT

## 2017-09-12 RX ORDER — FLUTICASONE PROPIONATE 50 MCG
2 SPRAY, SUSPENSION (ML) NASAL DAILY
Qty: 1 INHALER | Refills: 0 | Status: SHIPPED | OUTPATIENT
Start: 2017-09-12 | End: 2017-10-05

## 2017-09-12 RX ORDER — AMOXICILLIN AND CLAVULANATE POTASSIUM 875; 125 MG/1; MG/1
1 TABLET, FILM COATED ORAL 2 TIMES DAILY
Qty: 20 TABLET | Refills: 0 | Status: SHIPPED | OUTPATIENT
Start: 2017-09-12 | End: 2017-09-22

## 2017-09-12 RX ORDER — OFLOXACIN 3 MG/ML
5 SOLUTION AURICULAR (OTIC) 2 TIMES DAILY
Qty: 10 ML | Refills: 0 | Status: SHIPPED | OUTPATIENT
Start: 2017-09-12 | End: 2017-09-19

## 2017-09-12 NOTE — PROGRESS NOTES
CHIEF COMPLAINT:   Patient presents with:  Ear Problem: Right ear. HPI:   Rob Vanegas is a 27year old female who presents for URI sxs for  1 weeks.   Patient reports tried nasal congestion, sinus pain/pressure, yellow thick mucous from nose, re Smoking status: Never Smoker                                                              Smokeless tobacco: Never Used                      Alcohol use:  No               Comment: RARE        REVIEW OF SYSTEMS:   GENERAL: decreased appetite  SKIN: no rashe Diya Escobar is a 27year old female who presents with symptoms that are consistent with    ASSESSMENT:   Acute non-recurrent maxillary sinusitis  (primary encounter diagnosis)  Ear pain, bilateral    PLAN: Meds as below. See patient Instructions. The symptoms of ABRS may be different for each person, and can include:  · Nasal congestion  · Runny nose  · Fluid draining from the nose down the throat (postnasal drip)  · Headache  · Cough  · Pain in the sinuses  · Thick, colored fluid from the nose (mu · Confusion or trouble staying awake   Date Last Reviewed: 3/3/2015  © 6971-8875 [address], [address] [place]. All rights reserved. This information is not intended as a substitute for professional medical care.  Edgar Gamboa

## 2017-10-03 ENCOUNTER — TELEPHONE (OUTPATIENT)
Dept: NEUROLOGY | Facility: CLINIC | Age: 27
End: 2017-10-03

## 2017-10-03 NOTE — TELEPHONE ENCOUNTER
Called patient's cell again and was able to leave a voicemail message asking pt to call to reschedule her appointment for today.

## 2017-10-04 ENCOUNTER — TELEPHONE (OUTPATIENT)
Dept: FAMILY MEDICINE CLINIC | Facility: CLINIC | Age: 27
End: 2017-10-04

## 2017-10-05 ENCOUNTER — OFFICE VISIT (OUTPATIENT)
Dept: FAMILY MEDICINE CLINIC | Facility: CLINIC | Age: 27
End: 2017-10-05

## 2017-10-05 VITALS
HEIGHT: 65.75 IN | RESPIRATION RATE: 18 BRPM | SYSTOLIC BLOOD PRESSURE: 110 MMHG | WEIGHT: 186.81 LBS | DIASTOLIC BLOOD PRESSURE: 62 MMHG | BODY MASS INDEX: 30.38 KG/M2 | HEART RATE: 88 BPM

## 2017-10-05 DIAGNOSIS — L90.6 STRIAE: Primary | ICD-10-CM

## 2017-10-05 DIAGNOSIS — R82.998 ELEVATED URINARY FREE CORTISOL LEVEL: ICD-10-CM

## 2017-10-05 DIAGNOSIS — E66.09 CLASS 1 OBESITY DUE TO EXCESS CALORIES WITHOUT SERIOUS COMORBIDITY WITH BODY MASS INDEX (BMI) OF 30.0 TO 30.9 IN ADULT: ICD-10-CM

## 2017-10-05 PROBLEM — N30.01 ACUTE CYSTITIS WITH HEMATURIA: Status: RESOLVED | Noted: 2017-07-05 | Resolved: 2017-10-05

## 2017-10-05 PROBLEM — H69.80 EUSTACHIAN TUBE DYSFUNCTION: Status: RESOLVED | Noted: 2017-05-25 | Resolved: 2017-10-05

## 2017-10-05 PROBLEM — N76.4 ABSCESS OF LABIA: Status: RESOLVED | Noted: 2017-07-05 | Resolved: 2017-10-05

## 2017-10-05 PROCEDURE — 99213 OFFICE O/P EST LOW 20 MIN: CPT | Performed by: FAMILY MEDICINE

## 2017-10-05 RX ORDER — DIAZEPAM 2 MG/1
2 TABLET ORAL EVERY 6 HOURS PRN
COMMUNITY
End: 2018-03-12

## 2017-10-05 NOTE — PROGRESS NOTES
Elli Vargas is a 27year old female. HPI:       C/o Left side of her stomach has been swelling, she noticed it about 1 month ago. No pain or discomfort, no tenderness. Noticed stretch marks on that side. The stretch marks are pink in color. Family history of nephrotic syndrome 4/25/2013    younger brother    • Headache disorder    • Hemicrania continua    • Hirsutism 10/4/2015   • Migraine 6/25/2013   • Multinodular goiter 1/15/2013   • Multinodular goiter    • Rash 3/21/2014    This is a new HENT:   Head: Normocephalic and atraumatic. Eyes: Conjunctivae and EOM are normal. No scleral icterus. Neck: Neck supple. Cardiovascular: Normal rate, regular rhythm and normal heart sounds. No murmur heard.   Pulmonary/Chest: Effort normal and b obesity due to excess calories without serious comorbidity with body mass index (BMI) of 30.0 to 30.9 in adult  Patient counseled on healthy diet and regular exercise. Consult endocrinologist.  - ENDOCRINOLOGY - INTERNAL    3.  Elevated urinary free cortis

## 2017-10-06 NOTE — PATIENT INSTRUCTIONS
Finding Your Ideal Weight  Most of the people in magazines and on TV are far slimmer than average, yet this is the “ideal” that many people aim for.  Before you decide that you won’t be happy until you get down to a certain number of pounds, consider:

## 2017-11-02 ENCOUNTER — LAB ENCOUNTER (OUTPATIENT)
Dept: LAB | Facility: HOSPITAL | Age: 27
End: 2017-11-02
Attending: INTERNAL MEDICINE
Payer: COMMERCIAL

## 2017-11-02 DIAGNOSIS — E66.9 LIFELONG OBESITY: Primary | ICD-10-CM

## 2017-11-02 PROCEDURE — 82306 VITAMIN D 25 HYDROXY: CPT

## 2017-11-02 PROCEDURE — 82670 ASSAY OF TOTAL ESTRADIOL: CPT

## 2017-11-02 PROCEDURE — 84402 ASSAY OF FREE TESTOSTERONE: CPT

## 2017-11-02 PROCEDURE — 82627 DEHYDROEPIANDROSTERONE: CPT

## 2017-11-02 PROCEDURE — 83036 HEMOGLOBIN GLYCOSYLATED A1C: CPT

## 2017-11-02 PROCEDURE — 84146 ASSAY OF PROLACTIN: CPT

## 2017-11-02 PROCEDURE — 84403 ASSAY OF TOTAL TESTOSTERONE: CPT

## 2017-11-02 PROCEDURE — 36415 COLL VENOUS BLD VENIPUNCTURE: CPT

## 2017-11-02 PROCEDURE — 82024 ASSAY OF ACTH: CPT

## 2017-11-02 PROCEDURE — 83002 ASSAY OF GONADOTROPIN (LH): CPT

## 2017-11-02 PROCEDURE — 83001 ASSAY OF GONADOTROPIN (FSH): CPT

## 2017-11-02 PROCEDURE — 82533 TOTAL CORTISOL: CPT

## 2017-11-02 PROCEDURE — 83525 ASSAY OF INSULIN: CPT

## 2018-03-09 ENCOUNTER — HOSPITAL ENCOUNTER (EMERGENCY)
Age: 28
Discharge: HOME | End: 2018-03-09
Attending: EMERGENCY MEDICINE
Payer: COMMERCIAL

## 2018-03-09 VITALS
RESPIRATION RATE: 16 BRPM | BODY MASS INDEX: 29.89 KG/M2 | SYSTOLIC BLOOD PRESSURE: 108 MMHG | WEIGHT: 186 LBS | TEMPERATURE: 98 F | HEART RATE: 91 BPM | DIASTOLIC BLOOD PRESSURE: 46 MMHG | OXYGEN SATURATION: 100 % | HEIGHT: 66 IN

## 2018-03-09 DIAGNOSIS — R11.2 NAUSEA AND VOMITING IN ADULT: Primary | ICD-10-CM

## 2018-03-09 DIAGNOSIS — N39.0 URINARY TRACT INFECTION WITHOUT HEMATURIA, SITE UNSPECIFIED: ICD-10-CM

## 2018-03-09 LAB
ALBUMIN SERPL-MCNC: 4 G/DL (ref 3.5–4.8)
ALP LIVER SERPL-CCNC: 75 U/L (ref 37–98)
ALT SERPL-CCNC: 28 U/L (ref 14–54)
AST SERPL-CCNC: 16 U/L (ref 15–41)
BASOPHILS # BLD AUTO: 0.03 X10(3) UL (ref 0–0.1)
BASOPHILS NFR BLD AUTO: 0.3 %
BILIRUB SERPL-MCNC: 0.4 MG/DL (ref 0.1–2)
BILIRUB UR QL STRIP.AUTO: NEGATIVE
BUN BLD-MCNC: 17 MG/DL (ref 8–20)
CALCIUM BLD-MCNC: 8.5 MG/DL (ref 8.3–10.3)
CHLORIDE: 106 MMOL/L (ref 101–111)
CLARITY UR REFRACT.AUTO: CLEAR
CO2: 25 MMOL/L (ref 22–32)
COLOR UR AUTO: YELLOW
CREAT BLD-MCNC: 0.73 MG/DL (ref 0.55–1.02)
EOSINOPHIL # BLD AUTO: 0.19 X10(3) UL (ref 0–0.3)
EOSINOPHIL NFR BLD AUTO: 2.1 %
ERYTHROCYTE [DISTWIDTH] IN BLOOD BY AUTOMATED COUNT: 11.9 % (ref 11.5–16)
GLUCOSE BLD-MCNC: 75 MG/DL (ref 70–99)
GLUCOSE UR STRIP.AUTO-MCNC: NEGATIVE MG/DL
HCT VFR BLD AUTO: 39.1 % (ref 34–50)
HGB BLD-MCNC: 13.1 G/DL (ref 12–16)
IMMATURE GRANULOCYTE COUNT: 0.02 X10(3) UL (ref 0–1)
IMMATURE GRANULOCYTE RATIO %: 0.2 %
KETONES UR STRIP.AUTO-MCNC: NEGATIVE MG/DL
LIPASE: 141 U/L (ref 73–393)
LYMPHOCYTES # BLD AUTO: 1.2 X10(3) UL (ref 0.9–4)
LYMPHOCYTES NFR BLD AUTO: 13.2 %
M PROTEIN MFR SERPL ELPH: 7.2 G/DL (ref 6.1–8.3)
MCH RBC QN AUTO: 29.6 PG (ref 27–33.2)
MCHC RBC AUTO-ENTMCNC: 33.5 G/DL (ref 31–37)
MCV RBC AUTO: 88.3 FL (ref 81–100)
MONOCYTES # BLD AUTO: 0.41 X10(3) UL (ref 0.1–1)
MONOCYTES NFR BLD AUTO: 4.5 %
NEUTROPHIL ABS PRELIM: 7.24 X10 (3) UL (ref 1.3–6.7)
NEUTROPHILS # BLD AUTO: 7.24 X10(3) UL (ref 1.3–6.7)
NEUTROPHILS NFR BLD AUTO: 79.7 %
NITRITE UR QL STRIP.AUTO: NEGATIVE
PH UR STRIP.AUTO: 5 [PH] (ref 4.5–8)
PLATELET # BLD AUTO: 241 10(3)UL (ref 150–450)
POCT LOT NUMBER: NORMAL
POCT URINE PREGNANCY: NEGATIVE
POTASSIUM SERPL-SCNC: 4.1 MMOL/L (ref 3.6–5.1)
PROT UR STRIP.AUTO-MCNC: NEGATIVE MG/DL
RBC # BLD AUTO: 4.43 X10(6)UL (ref 3.8–5.1)
RED CELL DISTRIBUTION WIDTH-SD: 38.5 FL (ref 35.1–46.3)
SODIUM SERPL-SCNC: 139 MMOL/L (ref 136–144)
SP GR UR STRIP.AUTO: >=1.03 (ref 1–1.03)
UROBILINOGEN UR STRIP.AUTO-MCNC: 0.2 MG/DL
WBC # BLD AUTO: 9.1 X10(3) UL (ref 4–13)

## 2018-03-09 PROCEDURE — 80053 COMPREHEN METABOLIC PANEL: CPT | Performed by: EMERGENCY MEDICINE

## 2018-03-09 PROCEDURE — 87086 URINE CULTURE/COLONY COUNT: CPT | Performed by: EMERGENCY MEDICINE

## 2018-03-09 PROCEDURE — 96361 HYDRATE IV INFUSION ADD-ON: CPT

## 2018-03-09 PROCEDURE — 85025 COMPLETE CBC W/AUTO DIFF WBC: CPT | Performed by: EMERGENCY MEDICINE

## 2018-03-09 PROCEDURE — 81001 URINALYSIS AUTO W/SCOPE: CPT | Performed by: EMERGENCY MEDICINE

## 2018-03-09 PROCEDURE — 83690 ASSAY OF LIPASE: CPT | Performed by: EMERGENCY MEDICINE

## 2018-03-09 PROCEDURE — 96365 THER/PROPH/DIAG IV INF INIT: CPT

## 2018-03-09 PROCEDURE — 81025 URINE PREGNANCY TEST: CPT

## 2018-03-09 PROCEDURE — 96375 TX/PRO/DX INJ NEW DRUG ADDON: CPT

## 2018-03-09 PROCEDURE — 99284 EMERGENCY DEPT VISIT MOD MDM: CPT

## 2018-03-09 RX ORDER — ONDANSETRON 2 MG/ML
4 INJECTION INTRAMUSCULAR; INTRAVENOUS
Status: DISCONTINUED | OUTPATIENT
Start: 2018-03-09 | End: 2018-03-09

## 2018-03-09 RX ORDER — ONDANSETRON 8 MG/1
8 TABLET, ORALLY DISINTEGRATING ORAL EVERY 6 HOURS PRN
Qty: 10 TABLET | Refills: 0 | Status: SHIPPED | OUTPATIENT
Start: 2018-03-09 | End: 2018-03-16

## 2018-03-09 RX ORDER — SULFAMETHOXAZOLE AND TRIMETHOPRIM 800; 160 MG/1; MG/1
1 TABLET ORAL 2 TIMES DAILY
Qty: 20 TABLET | Refills: 0 | Status: SHIPPED | OUTPATIENT
Start: 2018-03-09 | End: 2018-03-19

## 2018-03-09 NOTE — ED PROVIDER NOTES
Patient Seen in: [facility] [place]    History   Patient presents with:  Vomiting    Stated Complaint: vomiting and rectal bleeding     HPI    Patient complains of nausea with vomiting. No coffee-ground emesis.   This started today RARE      Review of Systems    Positive for stated complaint: vomiting and rectal bleeding   Other systems are as noted in HPI. Constitutional and vital signs reviewed. All other systems reviewed and negative except as noted above.     Physical Exam Notable for the following:     Neutrophil Absolute Prelim 7.24 (*)     Neutrophil Absolute 7.24 (*)     All other components within normal limits   COMP METABOLIC PANEL (14) - Normal   LIPASE - Normal   CBC WITH DIFFERENTIAL WITH PLATELET    Narrative: will treat with antibiotic pending cultures. She was given dose Rocephin here and Bactrim to use at home    It is very possible that the nausea and vomiting is a sign of early gastroenteritis .   As noted above, her abdominal examination is benign  At this

## 2018-03-12 ENCOUNTER — OFFICE VISIT (OUTPATIENT)
Dept: FAMILY MEDICINE CLINIC | Facility: CLINIC | Age: 28
End: 2018-03-12

## 2018-03-12 VITALS
WEIGHT: 194.63 LBS | RESPIRATION RATE: 16 BRPM | BODY MASS INDEX: 32.04 KG/M2 | HEIGHT: 65.35 IN | SYSTOLIC BLOOD PRESSURE: 112 MMHG | TEMPERATURE: 98 F | DIASTOLIC BLOOD PRESSURE: 64 MMHG | HEART RATE: 68 BPM

## 2018-03-12 DIAGNOSIS — K62.5 RECTAL BLEEDING: ICD-10-CM

## 2018-03-12 DIAGNOSIS — R11.2 NON-INTRACTABLE VOMITING WITH NAUSEA, UNSPECIFIED VOMITING TYPE: Primary | ICD-10-CM

## 2018-03-12 DIAGNOSIS — E66.09 CLASS 1 OBESITY DUE TO EXCESS CALORIES WITHOUT SERIOUS COMORBIDITY WITH BODY MASS INDEX (BMI) OF 32.0 TO 32.9 IN ADULT: ICD-10-CM

## 2018-03-12 DIAGNOSIS — Z78.9 HISTORY OF FOREIGN TRAVEL: ICD-10-CM

## 2018-03-12 PROCEDURE — 83013 H PYLORI (C-13) BREATH: CPT | Performed by: FAMILY MEDICINE

## 2018-03-12 PROCEDURE — 99213 OFFICE O/P EST LOW 20 MIN: CPT | Performed by: FAMILY MEDICINE

## 2018-03-12 NOTE — PROGRESS NOTES
Anh Pugh is a 24year old female. HPI:       Vomiting on and off for a couple of weeks, but consistently for 4 days. Last 24 hours 6-7 episodes. Keeping water down today. Multiple family members have been diagnosed and treated for H pylori. Past Medical History:   Diagnosis Date   • Contact dermatitis 4/8/2014    adhesive from 30 day Holter monitor     • Enlarged thyroid    • Epistaxis 3/21/2014    one moderate episode    • Family history of kidney stones 3/9/2013   • Family history of neph Height as of this encounter: 65.35\". Weight as of this encounter: 194 lb 9.6 oz. Physical Exam   Nursing note and vitals reviewed. Constitutional: She is oriented to person, place, and time and obese. She appears well-developed. No distress.    HENT: Visit and as needed or indicated.

## 2018-03-12 NOTE — PATIENT INSTRUCTIONS
Vomiting (Adult)  Vomiting is a common symptom that may be due to different causes. These include gastroenteritis (\"stomach flu\"), food poisoning and gastritis.  There are other more serious causes of vomiting which may be hard to diagnose early in the During the first 12 to 24 hours follow the diet below:  · Fruit juices. Apple, grape juice, clear fruit drinks, and electrolyte replacement drinks. · Beverages. Soft drinks without caffeine; mineral water (plain or flavored), decaffeinated tea and coffee. Rectal bleeding is when blood passes through your rectum and anus. It can happen with or without a bowel movement. Rectal bleeding may be a sign of a serious problem in your rectum, colon, or upper GI tract.  Call your healthcare provider right away if you © 8505-7979 [address]. [address] [facility], [facility] [place]. All rights reserved. This information is not intended as a substitute for professional medical care. Always follow your healthcare professional's instructions.

## 2018-03-13 LAB — H. PYLORI BREATH TEST: NEGATIVE

## 2018-03-20 ENCOUNTER — OFFICE VISIT (OUTPATIENT)
Dept: FAMILY MEDICINE CLINIC | Facility: CLINIC | Age: 28
End: 2018-03-20

## 2018-03-20 ENCOUNTER — TELEPHONE (OUTPATIENT)
Dept: FAMILY MEDICINE CLINIC | Facility: CLINIC | Age: 28
End: 2018-03-20

## 2018-03-20 VITALS
OXYGEN SATURATION: 98 % | HEIGHT: 66 IN | BODY MASS INDEX: 31.02 KG/M2 | RESPIRATION RATE: 16 BRPM | DIASTOLIC BLOOD PRESSURE: 76 MMHG | SYSTOLIC BLOOD PRESSURE: 118 MMHG | WEIGHT: 193 LBS | HEART RATE: 74 BPM | TEMPERATURE: 98 F

## 2018-03-20 DIAGNOSIS — R59.9 LYMPH NODE ENLARGEMENT: Primary | ICD-10-CM

## 2018-03-20 PROCEDURE — 99212 OFFICE O/P EST SF 10 MIN: CPT | Performed by: NURSE PRACTITIONER

## 2018-03-20 RX ORDER — ONDANSETRON 4 MG/1
4 TABLET, ORALLY DISINTEGRATING ORAL EVERY 8 HOURS PRN
COMMUNITY
End: 2018-03-22

## 2018-03-20 NOTE — TELEPHONE ENCOUNTER
Pt called and said she was at the walk in clinic in [place] and they told her she needs to have an Ultrasound and biopsy and Dr. Namita Gonzalez needs to order it. Dr. Namita Gonzalez should be able to see all the notes from her visit.

## 2018-03-20 NOTE — PROGRESS NOTES
CHIEF COMPLAINT:     Patient presents with:  Bump: sore armits, 3 days,lumps in right armpit. HPI:   Adelaida Miller is a 24year old female who presents with complaints of 2 painful lumps in right armpit. Reports having had for 3 days.  Has history of rem NEURO: Denies headaches or lightheadedness    EXAM:   /76 (BP Location: Right arm, Patient Position: Sitting, Cuff Size: adult)   Pulse 74   Temp 98.3 °F (36.8 °C) (Oral)   Resp 16   Ht 66\"   Wt 193 lb   LMP 03/10/2018   SpO2 98%   BMI 31.15 kg/m² Lymphadenopathy is very common. The glands often enlarge during a viral or bacterial infection. It can happen during a cold, the flu, or strep throat. The nodes may swell in just one area of the body, such as the neck (localized).  Or nodes may swell all ov Your healthcare provider will ask about your health history and symptoms. He or she will give you a physical exam and check the areas where lymph nodes are enlarged.  Your healthcare provider will check the size and location of the nodes, and ask how long t The patient indicates understanding of these issues and agrees to the plan.

## 2018-03-20 NOTE — PATIENT INSTRUCTIONS
Lymphadenopathy  Lymphadenopathy is swelling of the lymph nodes. Lymph nodes are small, bean-shaped glands around the body. What are lymph nodes? Lymph nodes are part of your immune system.  These glands are found in your neck, over your clavicle, armpi You may also have symptoms from an infection causing the swollen glands. These symptoms may include fever, sore throat, body aches, or cough. Diagnosing lymphadenopathy  Your healthcare provider will ask about your health history and symptoms.  He or she w © 9864-4248 [employer] [address]. 1407 [facility], [facility] [place]. All rights reserved. This information is not intended as a substitute for professional medical care. Always follow your healthcare professional's instructions.

## 2018-03-21 NOTE — TELEPHONE ENCOUNTER
Pt was at the ER on 3/9/18 and the note stated they wanted pt to follow up with Dr. Loida Wong rather quickly.   lmom for pt to please call office back to discuss

## 2018-03-21 NOTE — TELEPHONE ENCOUNTER
Spoke to patient and she did go to the [facility] on 3/20/18. Per that OV note pt needs to follow up with . appt made for Monday 3/26/18 6:15pm.  Pt states since the visit she has noticed 2 more swollen areas.

## 2018-03-22 ENCOUNTER — PATIENT MESSAGE (OUTPATIENT)
Dept: FAMILY MEDICINE CLINIC | Facility: CLINIC | Age: 28
End: 2018-03-22

## 2018-03-22 RX ORDER — ONDANSETRON 4 MG/1
4 TABLET, ORALLY DISINTEGRATING ORAL EVERY 8 HOURS PRN
Qty: 10 TABLET | Refills: 0 | Status: SHIPPED | OUTPATIENT
Start: 2018-03-22 | End: 2018-04-05

## 2018-03-22 NOTE — TELEPHONE ENCOUNTER
----- Message from [address] [facility]. Artemio sent at 3/22/2018  9:48 AM CDT -----  Regarding: Other  Contact: [phone]  Good morning! Is there a way we can put in an order for more zofran?  I am vomiting again and ran out of my prescription from the hospital!

## 2018-04-08 ENCOUNTER — HOSPITAL ENCOUNTER (OUTPATIENT)
Dept: ULTRASOUND IMAGING | Age: 28
Discharge: HOME | End: 2018-04-08
Attending: INTERNAL MEDICINE
Payer: COMMERCIAL

## 2018-04-08 DIAGNOSIS — R11.0 NAUSEA: ICD-10-CM

## 2018-04-08 PROCEDURE — 76700 US EXAM ABDOM COMPLETE: CPT | Performed by: INTERNAL MEDICINE

## 2018-05-07 ENCOUNTER — LAB ENCOUNTER (OUTPATIENT)
Dept: LAB | Age: 28
End: 2018-05-07
Attending: INTERNAL MEDICINE
Payer: COMMERCIAL

## 2018-05-07 DIAGNOSIS — K31.9 MUCOSAL ABNORMALITY OF DUODENUM: ICD-10-CM

## 2018-05-07 PROCEDURE — 83516 IMMUNOASSAY NONANTIBODY: CPT

## 2018-05-07 PROCEDURE — 82784 ASSAY IGA/IGD/IGG/IGM EACH: CPT

## 2018-05-07 PROCEDURE — 86256 FLUORESCENT ANTIBODY TITER: CPT

## 2018-05-07 PROCEDURE — 36415 COLL VENOUS BLD VENIPUNCTURE: CPT

## 2018-05-16 ENCOUNTER — CHARTING TRANS (OUTPATIENT)
Dept: OTHER | Age: 28
End: 2018-05-16

## 2018-06-07 ENCOUNTER — TELEPHONE (OUTPATIENT)
Dept: OBGYN CLINIC | Facility: CLINIC | Age: 28
End: 2018-06-07

## 2018-06-07 NOTE — TELEPHONE ENCOUNTER
Patient calling to schedule NOB  LMP 5/5/18  Insurance Tori   Good time to return phone call anytime

## 2018-06-07 NOTE — TELEPHONE ENCOUNTER
Patient called back to schedule NOB. Her LMP was 05/05/18. This is her first pregnancy. Denies any concerns at this time. She had 2 + UPTS. NOB scheduled. Patient to call back with any questions or concerns.

## 2018-11-01 VITALS
DIASTOLIC BLOOD PRESSURE: 58 MMHG | TEMPERATURE: 98.8 F | WEIGHT: 192 LBS | HEIGHT: 66 IN | BODY MASS INDEX: 30.86 KG/M2 | RESPIRATION RATE: 18 BRPM | HEART RATE: 76 BPM | SYSTOLIC BLOOD PRESSURE: 104 MMHG

## 2018-11-08 ENCOUNTER — APPOINTMENT (OUTPATIENT)
Dept: CT IMAGING | Age: 28
End: 2018-11-08
Attending: EMERGENCY MEDICINE
Payer: COMMERCIAL

## 2018-11-08 ENCOUNTER — HOSPITAL ENCOUNTER (EMERGENCY)
Age: 28
Discharge: HOME | End: 2018-11-08
Attending: EMERGENCY MEDICINE
Payer: COMMERCIAL

## 2018-11-08 VITALS
HEIGHT: 66 IN | BODY MASS INDEX: 34.07 KG/M2 | TEMPERATURE: 97 F | WEIGHT: 212 LBS | HEART RATE: 90 BPM | SYSTOLIC BLOOD PRESSURE: 115 MMHG | RESPIRATION RATE: 16 BRPM | OXYGEN SATURATION: 98 % | DIASTOLIC BLOOD PRESSURE: 66 MMHG

## 2018-11-08 DIAGNOSIS — R55 NEAR SYNCOPE: Primary | ICD-10-CM

## 2018-11-08 PROCEDURE — 99285 EMERGENCY DEPT VISIT HI MDM: CPT

## 2018-11-08 PROCEDURE — 85025 COMPLETE CBC W/AUTO DIFF WBC: CPT | Performed by: EMERGENCY MEDICINE

## 2018-11-08 PROCEDURE — 85378 FIBRIN DEGRADE SEMIQUANT: CPT | Performed by: EMERGENCY MEDICINE

## 2018-11-08 PROCEDURE — 71275 CT ANGIOGRAPHY CHEST: CPT | Performed by: EMERGENCY MEDICINE

## 2018-11-08 PROCEDURE — 93005 ELECTROCARDIOGRAM TRACING: CPT

## 2018-11-08 PROCEDURE — 80053 COMPREHEN METABOLIC PANEL: CPT | Performed by: EMERGENCY MEDICINE

## 2018-11-08 PROCEDURE — 84484 ASSAY OF TROPONIN QUANT: CPT | Performed by: EMERGENCY MEDICINE

## 2018-11-08 PROCEDURE — 81003 URINALYSIS AUTO W/O SCOPE: CPT | Performed by: EMERGENCY MEDICINE

## 2018-11-08 PROCEDURE — 36415 COLL VENOUS BLD VENIPUNCTURE: CPT

## 2018-11-08 PROCEDURE — 93010 ELECTROCARDIOGRAM REPORT: CPT

## 2018-11-08 NOTE — ED PROVIDER NOTES
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    • Hirsutism 10/4/2015   • History of foreign travel 3/12/2018    [place]   • Indigestion 3/9/2018   • Loss of appetite    • Menses painful    • Migraine 6/25/2013   • Multinodular goiter 1/15/2013   • Mul Lungs: good air exchange and clear   Heart: regular rate rhythm and no murmur   Abdomen: Soft and nontender. Pregnant abdomen. No abdominal masses.   No peritoneal signs   Extremities: no edema, normal peripheral pulses   Neuro: Alert oriented and nonfo ---------                               -----------         ------                     CBC W/ DIFFERENTIAL[[id number]]          Abnormal            Final result                 Please view results for these tests on the individual orders.    BLANCO with palpitations, shortness of breath, chest discomfort and near loss of consciousness. She is approximately 27 weeks pregnant. Labs including a d-dimer was sent. D-dimer returned positive.   She is listed as having a IV contrast allergy but she reports

## 2018-11-08 NOTE — ED NOTES
Patient denies contractions, vaginal fluid leakage, vaginal bleeding and also states she can feel fetal movement as per her norm

## 2019-02-15 ENCOUNTER — HOSPITAL (OUTPATIENT)
Dept: OTHER | Age: 29
End: 2019-02-15

## 2019-02-15 LAB
ABO + RH BLD: NORMAL
BLD GP AB SCN SERPL QL: NEGATIVE
CROSSMATCH EXPIRE: NORMAL

## 2019-02-16 ENCOUNTER — HOSPITAL (OUTPATIENT)
Dept: OTHER | Age: 29
End: 2019-02-16

## 2019-02-16 ENCOUNTER — HOSPITAL (OUTPATIENT)
Dept: OTHER | Age: 29
End: 2019-02-16
Attending: OBSTETRICS & GYNECOLOGY

## 2019-02-16 LAB
ANALYZER ANC (IANC): NORMAL
APPEARANCE UR: CLEAR
BASE DEFICIT BLDCOA-SCNC: 5 MMOL/L
BASE DEFICIT BLDCOV-SCNC: 3 MMOL/L
BASE EXCESS BLDCOA CALC-SCNC: NORMAL MMOL/L
BASE EXCESS-RC: ABNORMAL
BILIRUB UR QL STRIP: NEGATIVE
COLOR UR: YELLOW
ERYTHROCYTE [DISTWIDTH] IN BLOOD: 13.5 % (ref 11–15)
GLUCOSE UR STRIP-MCNC: NEGATIVE MG/DL
HCO3 BLDCOA-SCNC: 23 MMOL/L (ref 21–28)
HCO3 BLDCOV-SCNC: 21 MMOL/L (ref 22–29)
HEMATOCRIT: 40.4 % (ref 36–46.5)
HEMOCCULT STL QL: NEGATIVE
HGB BLD-MCNC: 13.6 GM/DL (ref 12–15.5)
KETONES UR STRIP-MCNC: NEGATIVE MG/DL
LEUKOCYTE ESTERASE UR QL STRIP: ABNORMAL
MCH RBC QN AUTO: 30 PG (ref 26–34)
MCHC RBC AUTO-ENTMCNC: 33.7 GM/DL (ref 32–36.5)
MCV RBC AUTO: 89.2 FL (ref 78–100)
NITRITE UR QL STRIP: NEGATIVE
NRBC (NRBCRE): 0 /100 WBC
PCO2 BLDCOA: 53 MM HG (ref 31–74)
PCO2 BLDCOV: 37 MM HG (ref 23–49)
PH BLDCOA: 7.24 UNIT (ref 7.18–7.38)
PH BLDCOV: 7.37 UNIT (ref 7.25–7.45)
PH UR STRIP: 7 UNIT (ref 5–7)
PLATELET # BLD: 234 THOUSAND/MCL (ref 140–450)
PO2 BLDCOV: 26 MM HG (ref 17–41)
PO2 RC ARTERIAL CORD (RACPO): <20 MM HG (ref 6–31)
PROT UR STRIP-MCNC: NEGATIVE MG/DL
RBC # BLD: 4.53 MILLION/MCL (ref 4–5.2)
SP GR UR STRIP: 1.01 (ref 1–1.03)
UROBILINOGEN UR STRIP-MCNC: 0.2 MG/DL (ref 0–1)
WBC # BLD: 10.3 THOUSAND/MCL (ref 4.2–11)

## 2019-02-17 LAB
ANALYZER ANC (IANC): ABNORMAL
ERYTHROCYTE [DISTWIDTH] IN BLOOD: 13.7 % (ref 11–15)
HEMATOCRIT: 30.6 % (ref 36–46.5)
HGB BLD-MCNC: 10.3 GM/DL (ref 12–15.5)
MCH RBC QN AUTO: 30.6 PG (ref 26–34)
MCHC RBC AUTO-ENTMCNC: 33.7 GM/DL (ref 32–36.5)
MCV RBC AUTO: 90.8 FL (ref 78–100)
NRBC (NRBCRE): 0 /100 WBC
PLATELET # BLD: 159 THOUSAND/MCL (ref 140–450)
RBC # BLD: 3.37 MILLION/MCL (ref 4–5.2)
WBC # BLD: 13.4 THOUSAND/MCL (ref 4.2–11)

## 2019-08-06 ENCOUNTER — TELEPHONE (OUTPATIENT)
Dept: FAMILY MEDICINE CLINIC | Facility: CLINIC | Age: 29
End: 2019-08-06

## 2019-08-06 NOTE — TELEPHONE ENCOUNTER
Received medical records request from [facility] Dr. Jose Antonio Gillis. Records request sent to [employer].

## 2021-03-12 NOTE — PROGRESS NOTES
Mj Degroot is a 27year old female. HPI:         Weight Gain: gained #35 pounds in the last 9 months. Patient states she feels tired all the time. She never has any energy. Her mood is not depressed, she is not feeling down or anxious.   Her per RARE       REVIEW OF SYSTEMS:   Review of Systems   Constitutional: Positive for fatigue. Negative for fever, chills, diaphoresis and appetite change. HENT: Negative. Respiratory: Negative for cough, chest tightness and shortness of breath.     Cardiov Psychiatric: She has a normal mood and affect.  Her behavior is normal.         DATA:    Component      Latest Ref Rng 5/11/2017   T4,Free (Direct)      0.9-1.8 ng/dL 1.0   TSH      0.350-5.500 mIU/mL 1.380   ANTI-THYROGLOBULIN      <60 U/mL <15   ANTI-TH goiter  Right function tests normal.  - US THYROID (QEB=[id number]); Future  - TSH and Free T4 [E]; Future  - Triiodothyronine (T3) Total [E]; Future  - Triiodothyronine,Free,Serum [E]; Future  - Thyroid peroxidase & thyroglobulin ab [E];  Future  - Thyroid Stim Diet: regular -- appears malnourished - consider nutrition consult  DVT ppx: Lovenox 40mg Diet: C/C diet -- f/u nutrition consult  DVT ppx: MARGO CUNNINGHAM to chair

## (undated) NOTE — MR AVS SNAPSHOT
[facility]  [address] [facility] [place], [place] [address] [address] [phone]               Thank you for choosing us for your health care visit with CORTEZ Atkins.   We are glad to serve you and happy to provide you with this sum Vaginitis/Vaginosis, Dna Probe    Complete by:  Jun 06, 2017 (Approximate)    Assoc Dx:  PCB (post coital bleeding) [N93.0]                 Reason for Today's Visit     Other           Medical Issues Discussed Today     PCB (post coital bleeding)    -  Pr

## (undated) NOTE — MR AVS SNAPSHOT
[facility]  [phone]               Thank you for choosing us for your health care visit with Victoriano Phillips MD.  We are glad to serve you and happy to provide you with this castro VITAMIN A (RETINOL), SER/CHA VITAMIN C LEVEL VITAMIN E, SERUM OR PLASMA VITAMIN K1, SERUM VITB6, PYRIDOXAL 5-PHOSPHATE VL/BRANCHED-CHAIN FATTY ACIDS            Jun 06, 2017  3:30 PM   Exam - New Patient with CORTEZ Askew   [facility] These medications were sent to [address], [address]. [phone], [address] [place] [phone]     Phone:  [phone]    - mupirocin 2 % Oint            MyChart     Visit MyChart  You ca

## (undated) NOTE — MR AVS SNAPSHOT
[facility]  [id number]  [facility] [address] [address]  [phone]               Thank you for choosing us for your health care visit with Mansi Harper. We are glad to serve you and happy to provide you with this summary of your visit.   Braulio Instructions and Information about Your Health     None      Allergies as of Valdemar 15, 2017     Contrast Dye [Gadolinium Derivatives]     Dilaudid [Hydromorphone Hcl]     Morphine     Sumatriptan Other (See Comments)    Burning      Topamax [Topiramate] N

## (undated) NOTE — MR AVS SNAPSHOT
[facility]  [address] [address]  [phone]               Thank you for choosing us for your health care visit with Ra Wharton PT. We are glad to serve you and happy to provide you with this summary of your visit.   Please view more details from this visit by going to [email or web address]. If you've recently had a stay at the Hospital you can access your discharge instructions in InfoBasishart by going to Visits < Admission Summaries.  If you've been to the Emergency Depar

## (undated) NOTE — MR AVS SNAPSHOT
[facility]  [id number]  [facility] [address]  [phone]               Thank you for choosing us for your health care visit with Mansi Harper. We are glad to serve you and happy to provide you with this summary of your visit.   Braulio Summaries. If you've been to the Emergency Department or your doctor's office, you can view your past visit information in [employer] by going to Visits < Visit Summaries. [employer] questions? Call [phone] for help.   [employer] is NOT to be used for urge

## (undated) NOTE — ED AVS SNAPSHOT
Rajwinder Juarez   MRN: [id number]    Department:  THE [facility] Emergency Department in [place]   Date of Visit:  11/8/2018           Disclosure     Insurance plans vary and the physician(s) referred by the ER may not be covered by your plan.  Please contact tell this physician (or your personal doctor if your instructions are to return to your personal doctor) about any new or lasting problems. The primary care or specialist physician will see patients referred from the [facility] Emergency Department.  Willi Mason

## (undated) NOTE — MR AVS SNAPSHOT
[facility]  [id number]  [facility] [address]  [phone]               Thank you for choosing us for your health care visit with Mansi Harper. We are glad to serve you and happy to provide you with this summary of your visit.   Braulio Instructions and Information about Your Health     None      Allergies as of Jun 20, 2017     Contrast Dye [Gadolinium Derivatives]     Dilaudid [Hydromorphone Hcl]     Morphine     Sumatriptan Other (See Comments)    Burning      Topamax [Topiramate] N

## (undated) NOTE — ED AVS SNAPSHOT
THE [facility] Emergency Department in [address] [facility]    Phone:  [phone]    Fax:  [phone]   MRN: [id number]    Department:  THE [facility] Emergency Department in [place]   Date of Vis nuestro adminstrador de sera magallanes [phone]    Expect to receive an electronic request (by e-mail or text) to complete a self-assessment the day after your visit. You may also receive a call from our patient liason soon after your visit.  Also, some p [facility] [address] ([address]) [address]  ([address]) [address] [facility]. ([address]) [address]. (8 COMPARISON:  CHEST PA   LATERAL, 10/23/2012, 10:41. INDICATIONS:  dizziness     PATIENT STATED HISTORY: (As transcribed by Technologist)  Patient states for the past 4-5 days she experiences dizzy episodes when laying down for awhile.   Patient states t

## (undated) NOTE — LETTER
Patient Name: Zayra Austin  YOB: 1990          MRN number:  [id number]  Date:  8/8/2017  Referring Physician:  CORTEZ Hylton     Re-evaluation Summary    DX:   Acute pain of left shoulder (M25.512), Myofascial pain syndrome (M79 decreased left medial scapular burning from 2-3/10 to 1/10  Manual cervical traction with repeated retraction and left rotation decreased left lateral shoulder burning    Assessment: Holly Hanson is a 27year old female with 10 year history of left medial scapula

## (undated) NOTE — MR AVS SNAPSHOT
[facility]  [id number]  [facility] [address]  [phone]               Thank you for choosing us for your health care visit with Mansi Harper. We are glad to serve you and happy to provide you with this summary of your visit.   Braulio VITAMIN A (RETINOL), SER/CHA VITAMIN C LEVEL VITAMIN E, SERUM OR PLASMA VITAMIN K1, SERUM VITB6, PYRIDOXAL 5-PHOSPHATE VL/BRANCHED-CHAIN FATTY ACIDS            Jun 06, 2017  3:30 PM   Exam - New Patient with CORTEZ Calderon   [facility] Visit [facility] online at  [email or web address]

## (undated) NOTE — MR AVS SNAPSHOT
[facility]  [address] [address]  [phone]               Thank you for choosing us for your health care visit with Jennifer Milan PT. We are glad to serve you and happy to provide you with this summary of your visit.   Please Medical Issues Discussed Today     Acute pain of left shoulder    -  Primary      Instructions and Information about Your Health     None      Allergies as of Jun 20, 2017     Contrast Dye [Gadolinium Derivatives]     Dilaudid [Hydromorphone Hcl]     Morph

## (undated) NOTE — ED AVS SNAPSHOT
[facility] in [address] [facility]    Phone:  [phone]    Fax:  [phone]   MRN: [id number]    Department:  [facility] in [place]   Date of Vis IF THERE IS ANY CHANGE OR WORSENING OF YOUR CONDITION, CALL YOUR PRIMARY CARE PHYSICIAN AT ONCE OR RETURN IMMEDIATELY TO THE EMERGENCY DEPARTMENT.     If you have been prescribed any medication(s), please fill your prescription right away and begin taking t

## (undated) NOTE — LETTER
Date: 7/8/2017    Patient Name: Luan Decker          To Whom it may concern: This letter has been written at the patient's request. The above patient was seen at the [facility] for treatment of a medical condition.     This patient s

## (undated) NOTE — MR AVS SNAPSHOT
[facility] CoreaJohn mayberry [facility]  [phone]               Thank you for choosing us for your health care visit with Alfredo Martinez DO.   We are glad to serve you and happy to provide you with this s Thyroid peroxidase & thyroglobulin ab [E]    Complete by:   May 11, 2017 (Approximate)    Assoc Dx:  Multinodular goiter [E04.2], Thyromegaly [E01.0], T3 low in serum [R79.89], Abnormal thyroid function test [R94.6]           Thyroid Stimulating Immunoglob If you've recently had a stay at the Hospital you can access your discharge instructions in [employer] by going to Visits < Admission Summaries.  If you've been to the Emergency Department or your doctor's office, you can view your past visit information in My Visit [facility] online at  [email or web address]

## (undated) NOTE — ED AVS SNAPSHOT
Meena Adamson   MRN: [id number]    Department:  THE [facility] Emergency Department in [place]   Date of Visit:  3/9/2018           Disclosure     Insurance plans vary and the physician(s) referred by the ER may not be covered by your plan.  Please cont tell this physician (or your personal doctor if your instructions are to return to your personal doctor) about any new or lasting problems. The primary care or specialist physician will see patients referred from the [facility] Emergency Department.  Amelie Camara

## (undated) NOTE — MR AVS SNAPSHOT
[facility]  [id number]  [facility] [address]  [phone]               Thank you for choosing us for your health care visit with Mansi Harper. We are glad to serve you and happy to provide you with this summary of your visit.   Braulio Current Medications      Notice  As of 6/13/2017  3:39 PM    You have not been prescribed any medications.             MyChart     Visit MyChart  You can access your MyChart to more actively manage your health care and view more details from this visit by avinash